# Patient Record
Sex: MALE | Race: NATIVE HAWAIIAN OR OTHER PACIFIC ISLANDER | Employment: UNEMPLOYED | URBAN - METROPOLITAN AREA
[De-identification: names, ages, dates, MRNs, and addresses within clinical notes are randomized per-mention and may not be internally consistent; named-entity substitution may affect disease eponyms.]

---

## 2024-04-04 ENCOUNTER — APPOINTMENT (OUTPATIENT)
Dept: GENERAL RADIOLOGY | Age: 24
End: 2024-04-04
Payer: COMMERCIAL

## 2024-04-04 ENCOUNTER — HOSPITAL ENCOUNTER (EMERGENCY)
Age: 24
Discharge: HOME | End: 2024-04-04
Payer: COMMERCIAL

## 2024-04-04 VITALS
DIASTOLIC BLOOD PRESSURE: 86 MMHG | HEART RATE: 87 BPM | HEIGHT: 69 IN | TEMPERATURE: 97.5 F | SYSTOLIC BLOOD PRESSURE: 145 MMHG | BODY MASS INDEX: 24.44 KG/M2 | RESPIRATION RATE: 16 BRPM | WEIGHT: 165 LBS | OXYGEN SATURATION: 100 %

## 2024-04-04 DIAGNOSIS — S90.32XA CONTUSION OF LEFT FOOT INCLUDING TOES, INITIAL ENCOUNTER: Primary | ICD-10-CM

## 2024-04-04 DIAGNOSIS — S90.122A CONTUSION OF LEFT FOOT INCLUDING TOES, INITIAL ENCOUNTER: Primary | ICD-10-CM

## 2024-04-04 PROCEDURE — 73630 X-RAY EXAM OF FOOT: CPT

## 2024-04-04 PROCEDURE — 6370000000 HC RX 637 (ALT 250 FOR IP): Performed by: PHYSICIAN ASSISTANT

## 2024-04-04 PROCEDURE — 99283 EMERGENCY DEPT VISIT LOW MDM: CPT

## 2024-04-04 RX ORDER — IBUPROFEN 400 MG/1
800 TABLET ORAL ONCE
Status: COMPLETED | OUTPATIENT
Start: 2024-04-04 | End: 2024-04-04

## 2024-04-04 RX ADMIN — IBUPROFEN 800 MG: 400 TABLET, FILM COATED ORAL at 18:52

## 2024-04-04 ASSESSMENT — PAIN DESCRIPTION - LOCATION: LOCATION: FOOT

## 2024-04-04 ASSESSMENT — PAIN SCALES - GENERAL: PAINLEVEL_OUTOF10: 9

## 2024-04-04 ASSESSMENT — PAIN - FUNCTIONAL ASSESSMENT: PAIN_FUNCTIONAL_ASSESSMENT: 0-10

## 2024-04-04 ASSESSMENT — PAIN DESCRIPTION - ORIENTATION: ORIENTATION: LEFT

## 2024-04-04 NOTE — ED PROVIDER NOTES
[facility] EMERGENCY DEPARTMENT  EMERGENCY DEPARTMENT ENCOUNTER        Pt Name: Tejas Duarte  MRN: [id number]  Birthdate 2000  Date of evaluation: 4/4/2024  Provider: Sonia Saini PA-C  PCP: AZRA Hammond MD  Note Started: 6:46 PM EDT 4/4/24      ABDULAZIZ. I have evaluated this patient.        CHIEF COMPLAINT       Chief Complaint   Patient presents with    Foot Injury     Pt to ED with CC of L foot injury.  States that he dropped approximately 190 lb on L toes.       HISTORY OF PRESENT ILLNESS: 1 or more Elements     History From: patient  Limitations to history : None    Tejas Duarte is a 23 y.o. male who presents to the emergency department by private vehicle.  Patient dropped a weight on his left foot shortly prior to arrival.  He is have difficulty moving toes.  Denies any numbness/tingling.  No other injury sustained.    Nursing Notes were all reviewed and agreed with or any disagreements were addressed in the HPI.    REVIEW OF SYSTEMS :      Review of Systems    Positives and Pertinent negatives as per HPI.     SURGICAL HISTORY   History reviewed. No pertinent surgical history.    CURRENTMEDICATIONS       Discharge Medication List as of 4/4/2024  7:33 PM        CONTINUE these medications which have NOT CHANGED    Details   EPIPEN 2-TOYA 0.3 MG/0.3ML SOAJ injection INJECT 1 SHOT INTRAMUSCULARLY TO UPPER THIGH AS NEEDED FOR SEVERE ALLERGIC REACTION, Disp-2 each, R-1             ALLERGIES     Peanut-containing drug products    FAMILYHISTORY       Family History   Problem Relation Age of Onset    Prostate Cancer Father     Genetic Disorder Paternal Grandmother     Diabetes Paternal Grandmother     Diabetes Father     Diabetes Maternal Grandfather     Urolithiasis Paternal Grandmother     Diabetes Paternal Grandfather     Hypertension Father     Elevated Lipids Father     Elevated Lipids Mother         SOCIAL HISTORY       Social History     Tobacco Use    Smoking status:

## 2024-04-04 NOTE — DISCHARGE INSTRUCTIONS
You can use postop shoe short-term take pressure off your toes.  You do not necessarily need to use this.  Once your feet are feeling better you can move into a tennis shoe with good support in room for your toes.  Ice for 10 to 15 minutes multiple times a day.  Take ibuprofen 600 mg every 6-8 hours for pain and inflammation.  You will likely have increased bruising and swelling.  If persistent pain or unable to bear weight/move toes seek reevaluation with orthopedic specialist.  See their information listed above.